# Patient Record
(demographics unavailable — no encounter records)

---

## 2024-10-25 NOTE — REASON FOR VISIT
PAST MEDICAL HISTORY:  APS (antiphospholipid syndrome)     Breast cancer     Cigarette smoker     Facial paresthesia     History of COPD     History of COPD     History of depressed bipolar disorder     History of multiple cerebrovascular accidents (CVAs)     Migraines     Other depression     Paresthesia of left arm     Rheumatoid arthritis     Suicidal ideation      [Arrhythmia/ECG Abnorrmalities] : arrhythmia/ECG abnormalities

## 2024-10-25 NOTE — END OF VISIT
Subjective:     Jeffry Miller is a 6 y o  male who is brought in for this well child visit  History provided by: patient and father      We reviewed the depression screen today, no signs/ symptoms of anxiety or depression  No sleep/ stool/ void/ behavioral /school concerns  Current Issues:  7/18/22 - double 6 y well patient wells , great HPV questions, had T&A at age 9 year, dislocated elbow, 3 sets staples of head, molluscum , former 33 week premie em  Current concerns: none  Current allergies: as listed below     Well Child Assessment:  History was provided by the father  Dylan Garcia lives with his mother and father  Interval problems do not include recent illness or recent injury  Nutrition  Types of intake include cereals, eggs, fruits, meats, vegetables and cow's milk  Dental  The patient has a dental home  The patient brushes teeth regularly  Last dental exam was less than 6 months ago  Elimination  Elimination problems do not include constipation  There is no bed wetting  Behavioral  Behavioral issues do not include lying frequently, misbehaving with peers, misbehaving with siblings or performing poorly at school  Disciplinary methods include consistency among caregivers, praising good behavior and taking away privileges  Sleep  The patient does not snore  There are no sleep problems  Safety  There is no smoking in the home  School  There are no signs of learning disabilities  Child is doing well in school  Screening  Immunizations are up-to-date  There are no risk factors for hearing loss  There are no risk factors for anemia  There are no risk factors for dyslipidemia  There are no risk factors for tuberculosis  Social  The caregiver enjoys the child  After school, the child is at home with a parent  Sibling interactions are good  The following portions of the patient's history were reviewed and updated as appropriate:   He  has no past medical history on file    He   Patient Active Problem List    Diagnosis Date Noted    Chronic tonsillitis 12/27/2018    Recurrent epistaxis 12/27/2018    Sleep-disordered breathing 12/27/2018     He  has a past surgical history that includes Hernia repair; pr removal of tonsils,<13 y/o (N/A, 12/27/2018); pr ctrl nosebleed,anter,simple (Right, 12/27/2018); and Tonsillectomy  His family history includes Asthma in his paternal grandmother; No Known Problems in his father, maternal grandfather, maternal grandmother, mother, and paternal grandfather  He  reports that he is a non-smoker but has been exposed to tobacco smoke  He has never used smokeless tobacco  He reports that he does not drink alcohol and does not use drugs  No current outpatient medications on file  No current facility-administered medications for this visit  No current outpatient medications on file prior to visit  No current facility-administered medications on file prior to visit  He has No Known Allergies             Objective:       Vitals:    07/18/22 1403   BP: 114/70   Pulse: 100   Resp: 18   Weight: 54 5 kg (120 lb 3 2 oz)   Height: 4' 11" (1 499 m)     Growth parameters are noted and are appropriate for age  Wt Readings from Last 1 Encounters:   07/18/22 54 5 kg (120 lb 3 2 oz) (95 %, Z= 1 64)*     * Growth percentiles are based on CDC (Boys, 2-20 Years) data  Ht Readings from Last 1 Encounters:   07/18/22 4' 11" (1 499 m) (73 %, Z= 0 62)*     * Growth percentiles are based on CDC (Boys, 2-20 Years) data  Body mass index is 24 28 kg/m²      Vitals:    07/18/22 1403   BP: 114/70   Pulse: 100   Resp: 18   Weight: 54 5 kg (120 lb 3 2 oz)   Height: 4' 11" (1 499 m)        Hearing Screening    125Hz 250Hz 500Hz 1000Hz 2000Hz 3000Hz 4000Hz 6000Hz 8000Hz   Right ear: 25 25 25 25 25 25 25 25 25   Left ear: 25 25 25 25 25 25 25 25 25      Visual Acuity Screening    Right eye Left eye Both eyes   Without correction: 20/20 20/20 20/20   With correction: Physical Exam  Constitutional:       General: He is active  Appearance: He is well-developed  He is not toxic-appearing  HENT:      Head: Normocephalic  No facial anomaly  Right Ear: Tympanic membrane normal       Left Ear: Tympanic membrane normal       Nose: Nose normal       Mouth/Throat:      Mouth: Mucous membranes are moist       Pharynx: Oropharynx is clear  Eyes:      General:         Right eye: No discharge  Left eye: No discharge  Extraocular Movements:      Right eye: Normal extraocular motion  Left eye: Normal extraocular motion  Conjunctiva/sclera: Conjunctivae normal       Pupils: Pupils are equal, round, and reactive to light  Cardiovascular:      Rate and Rhythm: Normal rate and regular rhythm  Heart sounds: S1 normal and S2 normal  No murmur heard  Pulmonary:      Effort: Pulmonary effort is normal  No respiratory distress  Breath sounds: Normal breath sounds and air entry  Abdominal:      General: Bowel sounds are normal       Palpations: Abdomen is soft  There is no mass  Tenderness: There is no abdominal tenderness  Hernia: No hernia is present  There is no hernia in the left inguinal area  Genitourinary:     Penis: Normal  No phimosis or paraphimosis  Testes: Normal          Right: Right testis is descended  Left: Left testis is descended  Comments: Get 2  Musculoskeletal:         General: Normal range of motion  Cervical back: Normal range of motion  Skin:     General: Skin is warm  Findings: No rash  Comments: Faded ringworm lesion right arm, scant molluscum left axillary area   Neurological:      Mental Status: He is alert  Motor: No abnormal muscle tone  Coordination: Coordination normal       Gait: Gait normal    Psychiatric:         Mood and Affect: Mood is not anxious or depressed  Affect is not angry or inappropriate           Speech: Speech normal  Behavior: Behavior normal          Thought Content: Thought content normal          Judgment: Judgment normal            Assessment:     Healthy 6 y o  male child  1  Encounter for immunization     2  Depression screening          Plan:  Patient Instructions   o very nice to meet you all - great kids, great family   Both with :   Molluscum are very common skin warts  THey are caused by a virus (similar to the smallpox virus only NOT small pox NOT dangerous!)  They can multiply (especially if rubbed/ scratched by the child) and may last for months/ even 1-2 years  Supportive care is the mainstay of therapy and watch for bleeding/ irritation/ tenderness/ redness  Difficult to treat , please let us know if you want to try a cream I can prescribe (expensive if not covered) and not 100% treatment rate, or to Dermatology for "beetle juice " application - irritating , burning to skin but effective      _______________________________  Super important at your age to keep up with a "healthy active lifestyle"   To make good choices in what you eat and in keeping physically active  To protect you from dangerous diseases as you get older such as diabetes, heart disease, even cancer  Keep up the awesome job ! 5 - fruits and vegetables a day   2 - hours or less of video games/ you tube, etc    1 - hour or more of exercise daily   0 - sugary drinks  You asked great questions about the HPV, they both received it today :   YOur child needs the "Gardasil" shot , protecting against the Human Papilloma Virus"   31,000 Americans each year are diagnosed with cancer caused by HPV  It is transmitted by SKIN to SKIN (mother to baby, kissing) which is why it triggers mainly Oropharyngeal cancer but also cervical   ITS ALL ABOUT CANCER PREVENTION  AAP "Bright Futures" Anticipatory guidelines discussed and given to family appropriate for age, including guidance on healthy nutrition and staying active   [4. Prevent psychological harm to patient or others] : Reason - Prevent psychological harm to patient or others 1  Anticipatory guidance discussed  Specific topics reviewed: per AAP bright futures     Nutrition and Exercise Counseling: The patient's Body mass index is 24 28 kg/m²  This is 96 %ile (Z= 1 75) based on CDC (Boys, 2-20 Years) BMI-for-age based on BMI available as of 7/18/2022  Nutrition counseling provided:  Reviewed long term health goals and risks of obesity  Educational material provided to patient/parent regarding nutrition  Avoid juice/sugary drinks  Anticipatory guidance for nutrition given and counseled on healthy eating habits  5 servings of fruits/vegetables  Exercise counseling provided:  Anticipatory guidance and counseling on exercise and physical activity given  Educational material provided to patient/family on physical activity  Reduce screen time to less than 2 hours per day  Comments:       Depression Screening and Follow-up Plan:     Depression screening was negative with PHQ-A score of 0  Patient does not have thoughts of ending their life in the past month  Patient has not attempted suicide in their lifetime  2  Development: appropriate for age    1  Immunizations today: per orders  4  Follow-up visit in 1 year for next well child visit, or sooner as needed

## 2024-10-25 NOTE — HISTORY OF PRESENT ILLNESS
[FreeTextEntry1] : This is an 86 year old woman with a history of AS s/p TAVR, atrial fibrillation on Xarelto for stroke risk reduction, hypertension, hyperlipidemia, hypothyroid who presents to the office for a follow up visit.    She is walking with a walker, and gets tired after a few hundred feet and has to stop.   She has mild LE edema.  She has no PND, orthopnea, LE swelling, dizziness, or syncope.   he is now taking her medications reliably.  I placed a Zio, and she has PAF with a 12% burden.  She is rate controlled both in AF and in NSR.

## 2024-10-25 NOTE — DISCUSSION/SUMMARY
[FreeTextEntry1] : This is an 86 year old woman with a history of AS s/p TAVR, atrial fibrillation on Xarelto for stroke risk reduction, hypertension, hyperlipidemia, hypothyroid who presents to the office for a follow up visit.   She has mild LE edema.   I advised elevation.  She is going to continue AC with Xarelto.  She is now compliant as she is at the [facility].  She will continue simvastatin, which was increased in the hospital.  She will continue losartan 50 QD.   Her Zio showed PAF with a 12% burden.  She was rate controlled when in AF.  She will follow in 6 months.  They know to call the office with any issues.  She needs a repeat echocardiogram.  [EKG obtained to assist in diagnosis and management of assessed problem(s)] : EKG obtained to assist in diagnosis and management of assessed problem(s)

## 2024-10-25 NOTE — PHYSICAL EXAM
[Well Developed] : well developed [Well Nourished] : well nourished [No Acute Distress] : no acute distress [Normal Conjunctiva] : normal conjunctiva [Normal Venous Pressure] : normal venous pressure [No Carotid Bruit] : no carotid bruit [Normal S1, S2] : normal S1, S2 [No Rub] : no rub [No Gallop] : no gallop [Clear Lung Fields] : clear lung fields [Good Air Entry] : good air entry [No Respiratory Distress] : no respiratory distress  [Soft] : abdomen soft [Non Tender] : non-tender [No Masses/organomegaly] : no masses/organomegaly [Normal Bowel Sounds] : normal bowel sounds [Normal Gait] : normal gait [No Edema] : no edema [No Cyanosis] : no cyanosis [No Clubbing] : no clubbing [No Varicosities] : no varicosities [No Rash] : no rash [No Skin Lesions] : no skin lesions [Moves all extremities] : moves all extremities [No Focal Deficits] : no focal deficits [Normal Speech] : normal speech [Alert and Oriented] : alert and oriented [Normal memory] : normal memory [de-identified] : IRRR.  1/6 systolic murmur